# Patient Record
Sex: MALE | URBAN - METROPOLITAN AREA
[De-identification: names, ages, dates, MRNs, and addresses within clinical notes are randomized per-mention and may not be internally consistent; named-entity substitution may affect disease eponyms.]

---

## 2018-03-29 ENCOUNTER — OFFICE VISIT (OUTPATIENT)
Dept: ENDOCRINOLOGY | Facility: CLINIC | Age: 23
End: 2018-03-29
Payer: COMMERCIAL

## 2018-03-29 VITALS
DIASTOLIC BLOOD PRESSURE: 79 MMHG | WEIGHT: 231.5 LBS | SYSTOLIC BLOOD PRESSURE: 125 MMHG | HEART RATE: 67 BPM | OXYGEN SATURATION: 95 % | HEIGHT: 68 IN | BODY MASS INDEX: 35.09 KG/M2

## 2018-03-29 DIAGNOSIS — E66.812 CLASS 2 OBESITY DUE TO EXCESS CALORIES WITHOUT SERIOUS COMORBIDITY WITH BODY MASS INDEX (BMI) OF 35.0 TO 35.9 IN ADULT: Primary | ICD-10-CM

## 2018-03-29 DIAGNOSIS — E66.09 CLASS 2 OBESITY DUE TO EXCESS CALORIES WITHOUT SERIOUS COMORBIDITY WITH BODY MASS INDEX (BMI) OF 35.0 TO 35.9 IN ADULT: Primary | ICD-10-CM

## 2018-03-29 RX ORDER — AMOXICILLIN 500 MG
CAPSULE ORAL
COMMUNITY
Start: 2017-10-04

## 2018-03-29 RX ORDER — ESCITALOPRAM OXALATE 20 MG/1
20 TABLET ORAL
COMMUNITY
Start: 2017-12-14

## 2018-03-29 NOTE — LETTER
"3/29/2018       RE: Devang Patrick  [address]  [facility] [address]     Dear Colleague,    Thank you for referring your patient, Devang Patrick, to the [facility] MEDICAL WEIGHT MANAGEMENT at [facility]. Please see a copy of my visit note below.        New Medical Weight Management Consult    PATIENT:  Devang Patrick  MRN:         [id number]  :         1995  KAREEM:         3/29/2018    Dear No primary care provider on file.,    I had the pleasure of seeing your patient, Devang Patrick.  Full intake/assessment done to determine barriers to weight loss success and develop a treatment plan.  Devang Patrick is a 22 year old male interested in treatment of medical problems associated with weight.  His weight today is 231 lbs 8 oz, Body mass index is 35.09 kg/(m^2)., and he has the following co-morbidities:       3/29/2018   I have the following co-morbidities associated with obesity: High Cholesterol       Patient Goals Reviewed With Patient 3/29/2018   I am interested in attaining a healthier weight to diminish current health problems related to co-morbid conditions: Yes   I am interested in attaining a healthier weight in order to prevent future health problems: Yes       Referring Provider 3/29/2018   Please name the provider who referred you to Medical Weight Management.  If you do not know, please answer: \"I Don't Know\". Dont know        Wt Readings from Last 4 Encounters:   18 231 lb 8 oz       Weight History Reviewed With Patient 3/29/2018   How concerned are you about your weight? Very Concerned   Would you describe your weight gain as gradual? Yes   I became overweight: As a Child   The following factors have contributed to my weight gain:  A Health Crisis/Stress, Eating Too Much, Lack of Exercise   I have tried the following methods to lose weight: Watching Portions or Calories, Exercise   I have the following family history of " obesity/being overweight:  I am the only one in my immediate family who is overweight   Has anyone in your family had weight loss surgery? No       Diet Recall Reviewed With Patient 3/29/2018   How many glasses of juice do you drink in a typical day? 3   How many of glasses of milk do you drink in a typical day? 1   How many cans/bottles of sugar pop/soda/tea/sports drinks do you drink in a day? 1   How many cans/bottles of diet pop/soda/tea or sports drink do you drink in a day? 2   How often do you have a drink of alcohol? Monthly or Less   If you do drink, how many drinks might you have in a day? 3-4       Eating Habits Reviewed With Patient 3/29/2018   Generally, my meals include foods like these: bread, pasta, rice, potatoes, corn, crackers, sweet dessert, pop, or juice. Half of the Week   Generally, my meals include foods like these: fried meats, brats, burgers, french fries, pizza, cheese, chips, or ice cream. Half of the Week   Eat fast food (like [employer], [employer], [employer]aco Bell). Half of the Week   Eat at a buffet or sit-down restaurant. A Few Times a Week   Eat most of my meals in front of the TV or computer. Half of the Week   Often skip meals, eat at random times, have no regular eating times. Almost Everyday   Rarely sit down for a meal but snack or graze throughout.  Everyday   Eat extra snacks between meals. Almost Everyday   Eat most of my food at the end of the day. Half of the Week   Eat in the middle of the night or wake up at night to eat. Half of the Week   Eat extra snacks to prevent or correct low blood sugar. Never   Eat to prevent acid reflux or stomach pain. Never   Worry about not having enough food to eat. A Few Times a Week   Have you been to the food shelf at least a few times this year? No   I eat when I am depressed, stressed, anxious, or bored. Everyday   I eat when I am happy or as a reward. Almost Everyday   I feel hungry all the time even if I just have eaten. Everyday   Feeling  full is important to me. Almost Everyday   Once I start eating, it is hard to stop. Everyday   I finish all the food on my plate even if I am already full. Almost Everyday   I can't resist eating delicious food or walk past the good food/smell. Everyday   I eat/snack without noticing that I am eating. Half of the Week   I eat when I am preparing the meal. Never   I eat more than usual when I see others eating. Everyday   I have trouble not eating sweets, ice cream, cookies, or chips if they are around the house. Everyday   I think about food all day. Everyday   What foods, if any, do you crave? Sweets/Candy/Chocolate   I feel out of control when eating. Everyday   I eat a large amount of food, like a loaf of bread, a box of cookies, a pint/quart of ice cream, all at once. Monthly   I eat a large amount of food even when I am not hungry. Monthly   I eat rapidly. Everyday   I eat alone because I feel embarrassed and do not want others to see how much I have eaten. Everyday   I eat until I am uncomfortably full. Everyday   I feel bad, disgusted, or guilty after I overeat. Everyday   I make myself vomit what I have eaten or use laxatives to get rid of food. Never       Activity/Exercise History Reviewed With Patient 3/29/2018   How much of a typical 12 hour day do you spend sitting? Half the Day   How much of a typical 12 hour day do you spend lying down? Half the Day   How much of a typical day do you spend walking/standing? Half the Day   How many hours (not including work) do you spend on the TV/Video Games/Computer/Tablet/Phone? 6 Hours or More   How many times a week are you active for the purpose of exercise? Once a Week   How many total minutes do you spend doing some activity for the purpose of exercising when you exercise? Less Than 15 Minutes   What keeps you from being more active? Too tired, Unsure What To Do, Worried People Will Look At Me       ROS    PAST MEDICAL HISTORY:  History reviewed. No pertinent  "past medical history.    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 50%   What is your marital status? Single   If in a relationship, is your significant other overweight? Yes   Do you have children? No   If you have children, are they overweight? N/A       Mental Health History Reviewed With Patient 3/29/2018   Have you ever been physically or sexually abused? No   How often in the past 2 weeks have you felt little interest or pleasure in doing things? Nearly Everyday   Over the past 2 weeks how often have you felt down, depressed, or hopeless? More Than Half the Days       Sleep History Reviewed With Patient 3/29/2018   How many hours do you sleep at night? 7   Do you think that you snore loudly or has anybody ever heard you snore loudly (louder than talking or so loud it can be heard behind a shut door)? No   Has anyone seen or heard you stop breathing during your sleep? No   Do you often feel tired, fatigued, or sleepy during the day? Yes       MEDICATIONS:   Current Outpatient Prescriptions   Medication Sig Dispense Refill     escitalopram (LEXAPRO) 20 MG tablet Take 20 mg by mouth       Omega-3 Fatty Acids (FISH OIL) 1200 MG capsule          ALLERGIES:   No Known Allergies    PHYSICAL EXAM:  /79  Pulse 67  Ht 5' 8.11\"  Wt 231 lb 8 oz  SpO2 95%  BMI 35.09 kg/m2   A & O x 3  HEENT: NCAT, mucous membranes moist  Respirations unlabored  Location of obesity: Mixed Obesity    ASSESSMENT:  Devang is a patient with early onset obesity without significant element of familial/genetic influence and without current health consequences. He does need aggressive weight loss plan due to BMI 35.  Devang Santana Celina endorses binging, eats a high carb diet, eats a high fat diet, eats fast food once or more per week, eats to obtain specific degree of fullness, tends to snack/graze throughout " day, rarely sitting to eat a true meal and has a disorganized meal pattern.    His problem is complicated by mental health/psychopharmacological barriers and poor lifestyle choices    PLAN:    Start Qsymia 7.5/46      MEDICATION STARTED AT THIS APPOINTMENT    We are starting Qsymia. This is a specific obesity medication and is a combination of Phentermine and Topiramate, formulated as a sustained release, taken one time a day. There are a few different doses of the medication. Doses come in 3.75/23 mg (usually skipped), 7.5/46 mg, 11.25/69 mg, and 15/92 mg. Call the nurse at [phone] if you have any questions or concerns. (Do not stop taking it if you don't think it's working. For some people it works even though they do not feel much different.)    For some of our patients, the pills work right away. They feel and think quite differently about food. Other patients don't feel much of a change but find in fact they have lost weight! Like all weight loss medications, Qsymia works best when you help it work.  This means:    1) Have less tempting high calorie (fattening) food around the house or office    2) Have lower calorie food (fruits, vegetables,low fat meats and dairy) for snacks    3) Eat out only one time or less each week.   4) Eat your meals at a table with the TV or computer off.    Side-effects (generally well tolerated because it is a sustained release medication)    Topiramate:   -Tingling in hands,feet, or face (usually not very troublesome)   -Mental confusion and word finding trouble (about 10% of patients have this.)     -Feeling sleepy or a bit dopey- this goes away very soon after starting.      Phentermine:    -Feelings of racing pulse or rapid heart beat.    -Increased anxiety.   -Some people can get an elevated blood pressure. Because of this we may have  you  come back within a week or so of starting the medication for a blood pressure check.    One of the dangers of topiramate is the  possibility of birth defects--if you get pregnant when you are on it, there is the risk that your baby will be born with a cleft lip or palate.  If you are on topiramate and of child bearing age, you need to be on a reliable form of birth control or refrain from sexual intercourse.     It is very rare for insurance to pay for this and it typically costs around $200 per month. Please check out the website www.qsymia.com and sign up for the Pharmacy savings program to save $75 a month for 12 months if eligible. The medication can also be paid for out of pocket. It may require a prior authorization which could take up to 1-2 weeks.      In order to get refills of this or any medication we prescribe you must be seen in the medical weight mgmt clinic every 2-4 months. Please have your pharmacy fax a refill request to [phone].      RTC:    12 weeks.    TIME: 15 min spent on evaluation, management, counseling, education, & motivational interviewing with greater than 50 % of the total time was spent on counseling and coordinating care    Sincerely,    Veronika Willis PA-C        Again, thank you for allowing me to participate in the care of your patient.      Sincerely,    Veronika Willis PA-C

## 2018-03-29 NOTE — PROGRESS NOTES
"    New Medical Weight Management Consult    PATIENT:  Devang Patrick  MRN:         [id number]  :         1995  KAREEM:         3/29/2018    Dear No primary care provider on file.,    I had the pleasure of seeing your patient, Devang Patrick.  Full intake/assessment done to determine barriers to weight loss success and develop a treatment plan.  Devang Patrick is a 22 year old male interested in treatment of medical problems associated with weight.  His weight today is 231 lbs 8 oz, Body mass index is 35.09 kg/(m^2)., and he has the following co-morbidities:       3/29/2018   I have the following co-morbidities associated with obesity: High Cholesterol       Patient Goals Reviewed With Patient 3/29/2018   I am interested in attaining a healthier weight to diminish current health problems related to co-morbid conditions: Yes   I am interested in attaining a healthier weight in order to prevent future health problems: Yes       Referring Provider 3/29/2018   Please name the provider who referred you to Medical Weight Management.  If you do not know, please answer: \"I Don't Know\". Dont know        Wt Readings from Last 4 Encounters:   18 231 lb 8 oz       Weight History Reviewed With Patient 3/29/2018   How concerned are you about your weight? Very Concerned   Would you describe your weight gain as gradual? Yes   I became overweight: As a Child   The following factors have contributed to my weight gain:  A Health Crisis/Stress, Eating Too Much, Lack of Exercise   I have tried the following methods to lose weight: Watching Portions or Calories, Exercise   I have the following family history of obesity/being overweight:  I am the only one in my immediate family who is overweight   Has anyone in your family had weight loss surgery? No       Diet Recall Reviewed With Patient 3/29/2018   How many glasses of juice do you drink in a typical day? 3   How many of glasses of milk do you drink in a " typical day? 1   How many cans/bottles of sugar pop/soda/tea/sports drinks do you drink in a day? 1   How many cans/bottles of diet pop/soda/tea or sports drink do you drink in a day? 2   How often do you have a drink of alcohol? Monthly or Less   If you do drink, how many drinks might you have in a day? 3-4       Eating Habits Reviewed With Patient 3/29/2018   Generally, my meals include foods like these: bread, pasta, rice, potatoes, corn, crackers, sweet dessert, pop, or juice. Half of the Week   Generally, my meals include foods like these: fried meats, brats, burgers, french fries, pizza, cheese, chips, or ice cream. Half of the Week   Eat fast food (like McDonal[employer], [employer], Taco Bell). Half of the Week   Eat at a buffet or sit-down restaurant. A Few Times a Week   Eat most of my meals in front of the TV or computer. Half of the Week   Often skip meals, eat at random times, have no regular eating times. Almost Everyday   Rarely sit down for a meal but snack or graze throughout.  Everyday   Eat extra snacks between meals. Almost Everyday   Eat most of my food at the end of the day. Half of the Week   Eat in the middle of the night or wake up at night to eat. Half of the Week   Eat extra snacks to prevent or correct low blood sugar. Never   Eat to prevent acid reflux or stomach pain. Never   Worry about not having enough food to eat. A Few Times a Week   Have you been to the food shelf at least a few times this year? No   I eat when I am depressed, stressed, anxious, or bored. Everyday   I eat when I am happy or as a reward. Almost Everyday   I feel hungry all the time even if I just have eaten. Everyday   Feeling full is important to me. Almost Everyday   Once I start eating, it is hard to stop. Everyday   I finish all the food on my plate even if I am already full. Almost Everyday   I can't resist eating delicious food or walk past the good food/smell. Everyday   I eat/snack without noticing that I am  eating. Half of the Week   I eat when I am preparing the meal. Never   I eat more than usual when I see others eating. Everyday   I have trouble not eating sweets, ice cream, cookies, or chips if they are around the house. Everyday   I think about food all day. Everyday   What foods, if any, do you crave? Sweets/Candy/Chocolate   I feel out of control when eating. Everyday   I eat a large amount of food, like a loaf of bread, a box of cookies, a pint/quart of ice cream, all at once. Monthly   I eat a large amount of food even when I am not hungry. Monthly   I eat rapidly. Everyday   I eat alone because I feel embarrassed and do not want others to see how much I have eaten. Everyday   I eat until I am uncomfortably full. Everyday   I feel bad, disgusted, or guilty after I overeat. Everyday   I make myself vomit what I have eaten or use laxatives to get rid of food. Never       Activity/Exercise History Reviewed With Patient 3/29/2018   How much of a typical 12 hour day do you spend sitting? Half the Day   How much of a typical 12 hour day do you spend lying down? Half the Day   How much of a typical day do you spend walking/standing? Half the Day   How many hours (not including work) do you spend on the TV/Video Games/Computer/Tablet/Phone? 6 Hours or More   How many times a week are you active for the purpose of exercise? Once a Week   How many total minutes do you spend doing some activity for the purpose of exercising when you exercise? Less Than 15 Minutes   What keeps you from being more active? Too tired, Unsure What To Do, Worried People Will Look At Me       ROS    PAST MEDICAL HISTORY:  History reviewed. No pertinent past medical history.    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 50%   What is your marital status? Single   If in a relationship, is your significant other  "overweight? Yes   Do you have children? No   If you have children, are they overweight? N/A       Mental Health History Reviewed With Patient 3/29/2018   Have you ever been physically or sexually abused? No   How often in the past 2 weeks have you felt little interest or pleasure in doing things? Nearly Everyday   Over the past 2 weeks how often have you felt down, depressed, or hopeless? More Than Half the Days       Sleep History Reviewed With Patient 3/29/2018   How many hours do you sleep at night? 7   Do you think that you snore loudly or has anybody ever heard you snore loudly (louder than talking or so loud it can be heard behind a shut door)? No   Has anyone seen or heard you stop breathing during your sleep? No   Do you often feel tired, fatigued, or sleepy during the day? Yes       MEDICATIONS:   Current Outpatient Prescriptions   Medication Sig Dispense Refill     escitalopram (LEXAPRO) 20 MG tablet Take 20 mg by mouth       Omega-3 Fatty Acids (FISH OIL) 1200 MG capsule          ALLERGIES:   No Known Allergies    PHYSICAL EXAM:  /79  Pulse 67  Ht 5' 8.11\"  Wt 231 lb 8 oz  SpO2 95%  BMI 35.09 kg/m2   A & O x 3  HEENT: NCAT, mucous membranes moist  Respirations unlabored  Location of obesity: Mixed Obesity    ASSESSMENT:  Devang is a patient with early onset obesity without significant element of familial/genetic influence and without current health consequences. He does need aggressive weight loss plan due to BMI 35.  Devang Patrick endorses binging, eats a high carb diet, eats a high fat diet, eats fast food once or more per week, eats to obtain specific degree of fullness, tends to snack/graze throughout day, rarely sitting to eat a true meal and has a disorganized meal pattern.    His problem is complicated by mental health/psychopharmacological barriers and poor lifestyle choices    PLAN:    Start Qsymia 7.5/46      MEDICATION STARTED AT THIS APPOINTMENT    We are starting Qsymia. This " is a specific obesity medication and is a combination of Phentermine and Topiramate, formulated as a sustained release, taken one time a day. There are a few different doses of the medication. Doses come in 3.75/23 mg (usually skipped), 7.5/46 mg, 11.25/69 mg, and 15/92 mg. Call the nurse at [phone] if you have any questions or concerns. (Do not stop taking it if you don't think it's working. For some people it works even though they do not feel much different.)    For some of our patients, the pills work right away. They feel and think quite differently about food. Other patients don't feel much of a change but find in fact they have lost weight! Like all weight loss medications, Qsymia works best when you help it work.  This means:    1) Have less tempting high calorie (fattening) food around the house or office    2) Have lower calorie food (fruits, vegetables,low fat meats and dairy) for snacks    3) Eat out only one time or less each week.   4) Eat your meals at a table with the TV or computer off.    Side-effects (generally well tolerated because it is a sustained release medication)    Topiramate:   -Tingling in hands,feet, or face (usually not very troublesome)   -Mental confusion and word finding trouble (about 10% of patients have this.)     -Feeling sleepy or a bit dopey- this goes away very soon after starting.      Phentermine:    -Feelings of racing pulse or rapid heart beat.    -Increased anxiety.   -Some people can get an elevated blood pressure. Because of this we may have  you  come back within a week or so of starting the medication for a blood pressure check.    One of the dangers of topiramate is the possibility of birth defects--if you get pregnant when you are on it, there is the risk that your baby will be born with a cleft lip or palate.  If you are on topiramate and of child bearing age, you need to be on a reliable form of birth control or refrain from sexual intercourse.     It is  very rare for insurance to pay for this and it typically costs around $200 per month. Please check out the website www.qsymia.com and sign up for the Pharmacy savings program to save $75 a month for 12 months if eligible. The medication can also be paid for out of pocket. It may require a prior authorization which could take up to 1-2 weeks.      In order to get refills of this or any medication we prescribe you must be seen in the medical weight mgmt clinic every 2-4 months. Please have your pharmacy fax a refill request to [phone].      RTC:    12 weeks.    TIME: 15 min spent on evaluation, management, counseling, education, & motivational interviewing with greater than 50 % of the total time was spent on counseling and coordinating care    Sincerely,    Veronika Willis PA-C

## 2018-03-29 NOTE — NURSING NOTE
"  Chief Complaint   Patient presents with     Weight Problem     NMWM     Vitals:    03/29/18 1711   BP: 125/79   Pulse: 67   SpO2: 95%   Weight: 231 lb 8 oz   Height: 5' 8.11\"     Body mass index is 35.09 kg/(m^2).  Sunday Davidson CMA    "

## 2018-03-29 NOTE — LETTER
Date:March 30, 2018      Patient was self referred, no letter generated. Do not send.        [facility] Health Information

## 2018-03-29 NOTE — PATIENT INSTRUCTIONS
Start Qsymia 7.5/46     See Veronika for return MWM in 3-4 months    MEDICATION STARTED AT THIS APPOINTMENT    We are starting Qsymia. This is a specific obesity medication and is a combination of Phentermine and Topiramate, formulated as a sustained release, taken one time a day. There are a few different doses of the medication. Doses come in 3.75/23 mg (usually skipped), 7.5/46 mg, 11.25/69 mg, and 15/92 mg. Call the nurse at [phone] if you have any questions or concerns. (Do not stop taking it if you don't think it's working. For some people it works even though they do not feel much different.)    For some of our patients, the pills work right away. They feel and think quite differently about food. Other patients don't feel much of a change but find in fact they have lost weight! Like all weight loss medications, Qsymia works best when you help it work.  This means:    1) Have less tempting high calorie (fattening) food around the house or office    2) Have lower calorie food (fruits, vegetables,low fat meats and dairy) for snacks    3) Eat out only one time or less each week.   4) Eat your meals at a table with the TV or computer off.    Side-effects (generally well tolerated because it is a sustained release medication)    Topiramate:   -Tingling in hands,feet, or face (usually not very troublesome)   -Mental confusion and word finding trouble (about 10% of patients have this.)     -Feeling sleepy or a bit dopey- this goes away very soon after starting.      Phentermine:    -Feelings of racing pulse or rapid heart beat.    -Increased anxiety.   -Some people can get an elevated blood pressure. Because of this we may have  you  come back within a week or so of starting the medication for a blood pressure check.    One of the dangers of topiramate is the possibility of birth defects--if you get pregnant when you are on it, there is the risk that your baby will be born with a cleft lip or palate.  If you are  on topiramate and of child bearing age, you need to be on a reliable form of birth control or refrain from sexual intercourse.     It is very rare for insurance to pay for this and it typically costs around $200 per month. Please check out the website www.qsymia.com and sign up for the Pharmacy savings program to save $75 a month for 12 months if eligible. The medication can also be paid for out of pocket. It may require a prior authorization which could take up to 1-2 weeks.      In order to get refills of this or any medication we prescribe you must be seen in the medical weight mgmt clinic every 2-4 months. Please have your pharmacy fax a refill request to [phone].

## 2018-03-29 NOTE — MR AVS SNAPSHOT
After Visit Summary   3/29/2018    Devang Patrick    MRN: [id number]           Patient Information     Date Of Birth          1995        Visit Information        Provider Department      3/29/2018 5:00 PM Veronika Willis PA-C [facility]        Today's Diagnoses     Class 2 obesity due to excess calories without serious comorbidity with body mass index (BMI) of 35.0 to 35.9 in adult    -  1      Care Instructions    Start Qsymia 7.5/46     See Veronika for return MWM in 3-4 months    MEDICATION STARTED AT THIS APPOINTMENT    We are starting Qsymia. This is a specific obesity medication and is a combination of Phentermine and Topiramate, formulated as a sustained release, taken one time a day. There are a few different doses of the medication. Doses come in 3.75/23 mg (usually skipped), 7.5/46 mg, 11.25/69 mg, and 15/92 mg. Call the nurse at [phone] if you have any questions or concerns. (Do not stop taking it if you don't think it's working. For some people it works even though they do not feel much different.)    For some of our patients, the pills work right away. They feel and think quite differently about food. Other patients don't feel much of a change but find in fact they have lost weight! Like all weight loss medications, Qsymia works best when you help it work.  This means:    1) Have less tempting high calorie (fattening) food around the house or office    2) Have lower calorie food (fruits, vegetables,low fat meats and dairy) for snacks    3) Eat out only one time or less each week.   4) Eat your meals at a table with the TV or computer off.    Side-effects (generally well tolerated because it is a sustained release medication)    Topiramate:   -Tingling in hands,feet, or face (usually not very troublesome)   -Mental confusion and word finding trouble (about 10% of patients have this.)     -Feeling sleepy or a bit dopey- this goes away very  soon after starting.      Phentermine:    -Feelings of racing pulse or rapid heart beat.    -Increased anxiety.   -Some people can get an elevated blood pressure. Because of this we may have  you  come back within a week or so of starting the medication for a blood pressure check.    One of the dangers of topiramate is the possibility of birth defects--if you get pregnant when you are on it, there is the risk that your baby will be born with a cleft lip or palate.  If you are on topiramate and of child bearing age, you need to be on a reliable form of birth control or refrain from sexual intercourse.     It is very rare for insurance to pay for this and it typically costs around $200 per month. Please check out the website www.qsymia.com and sign up for the Pharmacy savings program to save $75 a month for 12 months if eligible. The medication can also be paid for out of pocket. It may require a prior authorization which could take up to 1-2 weeks.      In order to get refills of this or any medication we prescribe you must be seen in the medical weight mgmt clinic every 2-4 months. Please have your pharmacy fax a refill request to [phone].                    Follow-ups after your visit        Who to contact     Please call your clinic at [phone] to:    Ask questions about your health    Make or cancel appointments    Discuss your medicines    Learn about your test results    Speak to your doctor            Additional Information About Your Visit        [email or web address] Information     [employer] gives you secure access to your electronic health record. If you see a primary care provider, you can also send messages to your care team and make appointments. If you have questions, please call your primary care clinic.  If you do not have a primary care provider, please call [phone] and they will assist you.      [employer] is an electronic gateway that provides easy, online access to your medical records. With [employer],  "you can request a clinic appointment, read your test results, renew a prescription or communicate with your care team.     To access your existing account, please contact your [facility] or call [phone] for assistance.        Care EveryWhere ID     This is your Care EveryWhere ID. This could be used by other organizations to access your [place] medical records  [id number]        Your Vitals Were     Pulse Height Pulse Oximetry BMI (Body Mass Index)          67 5' 8.11\" 95% 35.09 kg/m2         Blood Pressure from Last 3 Encounters:   03/29/18 125/79    Weight from Last 3 Encounters:   03/29/18 231 lb 8 oz              Today, you had the following     No orders found for display         Today's Medication Changes          These changes are accurate as of 3/29/18  5:30 PM.  If you have any questions, ask your nurse or doctor.               Start taking these medicines.        Dose/Directions    Phentermine-Topiramate 7.5-46 MG Cp24   Used for:  Class 2 obesity due to excess calories without serious comorbidity with body mass index (BMI) of 35.0 to 35.9 in adult   Started by:  Veronika Willis PA-C        Dose:  1 capsule   Take 1 capsule by mouth daily   Quantity:  30 capsule   Refills:  3            Where to get your medicines      Some of these will need a paper prescription and others can be bought over the counter.  Ask your nurse if you have questions.     Bring a paper prescription for each of these medications     Phentermine-Topiramate 7.5-46 MG Cp24                Primary Care Provider    None Specified       No primary provider on file.        Equal Access to Services     KARRIE GERARDO AH: Carlos Ram, damian freeman, thomas kaaljessica lloyd. So [facility] [phone].    ATENCIÓN: Si habla español, tiene a franco disposición servicios gratuitos de asistencia lingüística. Llame al [phone].    We comply with " applicable federal civil rights laws and [place] laws. We do not discriminate on the basis of race, color, national origin, age, disability, sex, sexual orientation, or gender identity.            Thank you!     Thank you for choosing [facility]  for your care. Our goal is always to provide you with excellent care. Hearing back from our patients is one way we can continue to improve our services. Please take a few minutes to complete the written survey that you may receive in the mail after your visit with us. Thank you!             Your Updated Medication List - Protect others around you: Learn how to safely use, store and throw away your medicines at www.disposemymeds.org.          This list is accurate as of 3/29/18  5:30 PM.  Always use your most recent med list.                   Brand Name Dispense Instructions for use Diagnosis    escitalopram 20 MG tablet    LEXAPRO     Take 20 mg by mouth        fish Oil 1200 MG capsule           Phentermine-Topiramate 7.5-46 MG Cp24     30 capsule    Take 1 capsule by mouth daily    Class 2 obesity due to excess calories without serious comorbidity with body mass index (BMI) of 35.0 to 35.9 in adult

## 2020-03-11 ENCOUNTER — HEALTH MAINTENANCE LETTER (OUTPATIENT)
Age: 25
End: 2020-03-11

## 2021-01-03 ENCOUNTER — HEALTH MAINTENANCE LETTER (OUTPATIENT)
Age: 26
End: 2021-01-03

## 2021-04-25 ENCOUNTER — HEALTH MAINTENANCE LETTER (OUTPATIENT)
Age: 26
End: 2021-04-25

## 2021-10-10 ENCOUNTER — HEALTH MAINTENANCE LETTER (OUTPATIENT)
Age: 26
End: 2021-10-10

## 2022-05-21 ENCOUNTER — HEALTH MAINTENANCE LETTER (OUTPATIENT)
Age: 27
End: 2022-05-21

## 2022-09-18 ENCOUNTER — HEALTH MAINTENANCE LETTER (OUTPATIENT)
Age: 27
End: 2022-09-18

## 2023-06-04 ENCOUNTER — HEALTH MAINTENANCE LETTER (OUTPATIENT)
Age: 28
End: 2023-06-04